# Patient Record
Sex: FEMALE | Race: OTHER | NOT HISPANIC OR LATINO | URBAN - METROPOLITAN AREA
[De-identification: names, ages, dates, MRNs, and addresses within clinical notes are randomized per-mention and may not be internally consistent; named-entity substitution may affect disease eponyms.]

---

## 2019-07-15 NOTE — PATIENT DISCUSSION
Discussed other treatments including steroid eyedrops, Restasis, Xiidra, punctal plugs, omega 3 fatty acids, Zylet/ Tobradex.

## 2021-10-26 NOTE — PROCEDURE NOTE: CLINICAL
PROCEDURE NOTE: Punctal Plugs, Jamil Elizondo ([id number], [id number]) #1 OU. Diagnosis: Keratoconjunctivitis Sicca, Not Specified As Sjögren's. Prior to treatment, the risks/benefits/alternatives were discussed. The patient wished to proceed with procedure. Temporary collagen plugs were inserted. Patient tolerated procedure well. There were no complications. Post procedure instructions given. 0.4/0.4 Quintess BLL. [place]

## 2022-02-08 ENCOUNTER — COMPREHENSIVE EXAM (OUTPATIENT)
Dept: URBAN - METROPOLITAN AREA CLINIC 16 | Facility: CLINIC | Age: 67
End: 2022-02-08

## 2022-02-08 DIAGNOSIS — H52.4: ICD-10-CM

## 2022-02-08 DIAGNOSIS — H53.031: ICD-10-CM

## 2022-02-08 DIAGNOSIS — Z01.00: ICD-10-CM

## 2022-02-08 DIAGNOSIS — H52.12: ICD-10-CM

## 2022-02-08 DIAGNOSIS — H52.222: ICD-10-CM

## 2022-02-08 DIAGNOSIS — H25.13: ICD-10-CM

## 2022-02-08 PROCEDURE — 92004 COMPRE OPH EXAM NEW PT 1/>: CPT

## 2022-02-08 PROCEDURE — 92015 DETERMINE REFRACTIVE STATE: CPT

## 2022-02-08 ASSESSMENT — KERATOMETRY
OD_AXISANGLE_DEGREES: 144
OD_AXISANGLE2_DEGREES: 54
OS_K2POWER_DIOPTERS: 45.25
OS_AXISANGLE2_DEGREES: 91
OS_AXISANGLE_DEGREES: 1
OD_K2POWER_DIOPTERS: 44.50
OS_K1POWER_DIOPTERS: 44.50
OD_K1POWER_DIOPTERS: 44.25

## 2022-02-08 ASSESSMENT — TONOMETRY
OS_IOP_MMHG: 14
OD_IOP_MMHG: 14

## 2022-02-08 ASSESSMENT — VISUAL ACUITY
OS_SC: 20/50-1
OD_SC: 20/50-2

## 2022-05-10 NOTE — PROCEDURE NOTE: CLINICAL
PROCEDURE NOTE: Punctal Plugs, Ar Been ([id number], [id number]) #1 OU. Diagnosis: Keratoconjunctivitis Sicca, Not Specified As Sjögren's. Prior to treatment, the risks/benefits/alternatives were discussed. The patient wished to proceed with procedure. Temporary collagen plugs were inserted. Patient tolerated procedure well. There were no complications. Post procedure instructions given. 0.5/0.5 BLL.

## 2022-11-07 NOTE — PROCEDURE NOTE: CLINICAL
PROCEDURE NOTE: Punctal Plugs, Dissolvable #1 OU. Diagnosis: Keratoconjunctivitis Sicca, Not Specified As Sjögren's. Anesthesia: Proparacaine 0.5%. Prior to treatment, the risks/benefits/alternatives were discussed. The patient wished to proceed with procedure. 1 drop of Proparacaine 0.5% was instilled. Puncta was dilated with punctal dilator. Dissolvable punctal plugs were inserted. Size/location of plugs inserted: 0.5/0.5 BLL. The patient tolerated the procedure well. There were no complications. Post procedure instructions given. Deepak Marie